# Patient Record
Sex: MALE | Race: ASIAN | URBAN - METROPOLITAN AREA
[De-identification: names, ages, dates, MRNs, and addresses within clinical notes are randomized per-mention and may not be internally consistent; named-entity substitution may affect disease eponyms.]

---

## 2018-08-03 ENCOUNTER — APPOINTMENT (RX ONLY)
Dept: URBAN - METROPOLITAN AREA CLINIC 37 | Facility: CLINIC | Age: 38
Setting detail: DERMATOLOGY
End: 2018-08-03

## 2018-08-03 DIAGNOSIS — L85.3 XEROSIS CUTIS: ICD-10-CM

## 2018-08-03 PROBLEM — L70.0 ACNE VULGARIS: Status: ACTIVE | Noted: 2018-08-03

## 2018-08-03 PROCEDURE — 99202 OFFICE O/P NEW SF 15 MIN: CPT

## 2018-08-03 RX ORDER — AMMONIUM LACTATE 12 %
CREAM (GRAM) TOPICAL
Qty: 1 | Refills: 3 | Status: ERX | COMMUNITY
Start: 2018-08-03

## 2018-08-03 RX ADMIN — Medication: at 19:39

## 2018-08-03 NOTE — HPI: DRY SKIN
How Severe Is Your Dry Skin?: moderate
Additional History: He has tried Aquaphor. No prescription strength treatments. Says he would say his skin is dry most of the time.

## 2022-09-01 ENCOUNTER — APPOINTMENT (RX ONLY)
Dept: URBAN - METROPOLITAN AREA CLINIC 151 | Facility: CLINIC | Age: 42
Setting detail: DERMATOLOGY
End: 2022-09-01

## 2022-09-01 DIAGNOSIS — L30.8 OTHER SPECIFIED DERMATITIS: ICD-10-CM

## 2022-09-01 DIAGNOSIS — L21.8 OTHER SEBORRHEIC DERMATITIS: ICD-10-CM

## 2022-09-01 DIAGNOSIS — L91.0 HYPERTROPHIC SCAR: ICD-10-CM

## 2022-09-01 PROCEDURE — ? DIAGNOSIS COMMENT

## 2022-09-01 PROCEDURE — ? COUNSELING

## 2022-09-01 PROCEDURE — ? PRESCRIPTION

## 2022-09-01 PROCEDURE — 99204 OFFICE O/P NEW MOD 45 MIN: CPT

## 2022-09-01 PROCEDURE — ? TREATMENT REGIMEN

## 2022-09-01 PROCEDURE — ? PRESCRIPTION MEDICATION MANAGEMENT

## 2022-09-01 RX ORDER — CLOBETASOL PROPIONATE 0.5 MG/G
OINTMENT TOPICAL
Qty: 45 | Refills: 2 | Status: ERX | COMMUNITY
Start: 2022-09-01

## 2022-09-01 RX ADMIN — CLOBETASOL PROPIONATE: 0.5 OINTMENT TOPICAL at 00:00

## 2022-09-01 ASSESSMENT — LOCATION ZONE DERM
LOCATION ZONE: ARM
LOCATION ZONE: SCALP

## 2022-09-01 ASSESSMENT — LOCATION SIMPLE DESCRIPTION DERM
LOCATION SIMPLE: POSTERIOR SCALP
LOCATION SIMPLE: RIGHT SHOULDER

## 2022-09-01 ASSESSMENT — LOCATION DETAILED DESCRIPTION DERM
LOCATION DETAILED: RIGHT POSTERIOR SHOULDER
LOCATION DETAILED: POSTERIOR MID-PARIETAL SCALP

## 2022-09-01 NOTE — PROCEDURE: DIAGNOSIS COMMENT
Detail Level: Zone
Render Risk Assessment In Note?: no
Comment: Mild today but pt notes that this is much better currently.

## 2022-09-01 NOTE — HPI: RASH ([email or web address])
Is This A New Presentation, Or A Follow-Up?: Dandruff
Additional History: Pt reports he recently stopped dying his hair as a way to decrease dandruff on his scalp.

## 2022-09-01 NOTE — PROCEDURE: PRESCRIPTION MEDICATION MANAGEMENT
Render In Strict Bullet Format?: No
Samples Given: Neutrogena hand cream
Initiate Treatment: Clobetasol ointment BID x2 weeks on hands (under glove occlusion)
Detail Level: Zone
Initiate Treatment: Clobetasol ointment BID PRN on back